# Patient Record
Sex: MALE | Race: WHITE
[De-identification: names, ages, dates, MRNs, and addresses within clinical notes are randomized per-mention and may not be internally consistent; named-entity substitution may affect disease eponyms.]

---

## 2022-06-30 ENCOUNTER — HOSPITAL ENCOUNTER (OUTPATIENT)
Dept: HOSPITAL 47 - OR | Age: 47
Discharge: HOME | End: 2022-06-30
Attending: UROLOGY
Payer: MEDICARE

## 2022-06-30 VITALS — RESPIRATION RATE: 16 BRPM

## 2022-06-30 VITALS — TEMPERATURE: 97.5 F

## 2022-06-30 VITALS — HEART RATE: 99 BPM | SYSTOLIC BLOOD PRESSURE: 125 MMHG | DIASTOLIC BLOOD PRESSURE: 86 MMHG

## 2022-06-30 VITALS — BODY MASS INDEX: 23.6 KG/M2

## 2022-06-30 DIAGNOSIS — N13.2: Primary | ICD-10-CM

## 2022-06-30 DIAGNOSIS — G40.909: ICD-10-CM

## 2022-06-30 DIAGNOSIS — Z79.899: ICD-10-CM

## 2022-06-30 DIAGNOSIS — Z90.79: ICD-10-CM

## 2022-06-30 DIAGNOSIS — Z85.47: ICD-10-CM

## 2022-06-30 DIAGNOSIS — G80.9: ICD-10-CM

## 2022-06-30 PROCEDURE — 74018 RADEX ABDOMEN 1 VIEW: CPT

## 2022-06-30 PROCEDURE — 52356 CYSTO/URETERO W/LITHOTRIPSY: CPT

## 2022-06-30 PROCEDURE — 82365 CALCULUS SPECTROSCOPY: CPT

## 2022-06-30 NOTE — P.OP
Date of Procedure: 06/30/22


Preoperative Diagnosis: 


Right renal calculus, right ureteral calculus


Postoperative Diagnosis: 


Same


Procedure(s) Performed: 


Cystoscopy, right ureteroscopy with Holmium laser lithotripsy and stone 

basketing, right ureteral stent change


Anesthesia: GETA


Surgeon: Bird Crain


Estimated Blood Loss (ml): 10


IV fluids (ml): 600


Pathology: other (Calculus fragments, sent for chemical analysis)


Condition: stable


Disposition: PACU


Indications for Procedure: 


The patient is a 46-year-old white male with a history of left testicular 

cancer.  He underwent a left orchiectomy 9 years ago and did not receive any 

adjuvant therapy.  He has had no recurrences.  He has a history of cerebral 

palsy and is noncommunicative.  He was admitted last month with acute onset of 

abdominal pain.  He had a known right renal calculus.  Computed tomography scan 

showed mild to moderate right hydronephrosis due to a 5 x 8 mm right distal 

ureteral calculus.  A 3 mm nonobstructing right renal calculus was also seen.  

He was found to have an obstructing calculus at the level of the SI joint, but 

it could not be reached ureteroscopically and therefore a ureteral stent was 

placed.


Operative Findings: 


Right ureteral calculus at the level of the iliac vessels, fragmented and 

removed completely.  A right renal calculus was seen and removed, though was 

unclear whether this may have been a fragment of the ureteral calculus which 

refluxed into the kidney.


Description of Procedure: 


The patient was taken to the operating room and placed in the dorsolithotomy 

position, with legs supported in Geovany stirrups.  The external genitalia was 

prepped and draped sterilely.  The 30 lens was used to introduce the 21-Indonesian 

Torres cystoscopic sheath through the urethra and into the bladder under direct 

vision.  The prostatic urethra showed evidence of mild lateral lobe enlargement.

 The bladder was examined in its entirety.  No abnormalities were seen.  

Grasping forceps were used to grasp the distal end of the right ureteral stent, 

which was removed along with the cystoscope.





A 0.038 inch Glidewire was passed through the stent and up to the right renal 

pelvis.  The stent was removed, and an 11/13-Indonesian ureteral access catheter was

passed over the wire, up to the level of the calculus.  The Torres Cobra flexible 

ureteroscope was then passed through the ureteral access catheter sheath, up to 

the stone.  The 200 micron Holmium laser probe was passed through the 

ureteroscope, and lithotripsy was performed.  The calculus fragmented readily.  

Once it was completely fragmented, several fragments refluxed proximally.  The 

ureteroscope was advanced up to the right kidney.  Each calyx was examined.  

Only one calculus was seen, within a mid pole calyx, and this appeared to have 

been a fragment from the ureteral calculus.  A 1.9-Indonesian nitinol basket was 

used to grasp this fragment and remove it.  Next, each of the remaining calculus

fragments within the ureter were removed via stone basketing.  The ureteral 

access catheter sheath came out, and the Torres semirigid ureteroscope was passed 

into the bladder under direct vision to inspect the distal ureter.  This 

confirmed that there were no residual calculus fragments seen within the ureter,

and there was no evidence of ureteral perforation.  The Glidewire was passed up 

to the right renal pelvis, and the ureteroscope was removed.  The Glidewire was 

backloaded into the cystoscope, which was passed into the bladder.  A 22 cm, 6-

Indonesian double-J ureteral stent was placed over the wire.  Proper stent 

positioning was verified fluoroscopically and endoscopically.  The bladder was 

emptied and the cystoscope removed.  The string was left attached to the stent, 

and was secured to the penis using a Tegaderm dressing.  The patient tolerated 

the procedure well and was taken to the recovery room in stable condition.











TAMMIE MONTANO Report:  





Procedure Acuity:  Elective


Stone Size and Location:  


Ureteral Dilation:  No


Ureteral Access Sheath Used:  Yes


Stone Sent for Analysis:  Yes


All Stones/Fragments Were Removed with a Basket:  Yes


Complications:  No


Preoperative Antibiotics Given:  Yes


Stent Placed:  Yes


If Stent Placed, Was String Left Attached:  Yes


If Stent Placed, When is it to be Removed:  1 week


Discharge Medications: None

## 2022-06-30 NOTE — XR
EXAMINATION TYPE: XR KUB

 

DATE OF EXAM: 6/30/2022

 

COMPARISON: NONE

 

INDICATION: Right ureteric stone

 

TECHNIQUE: AP view of the abdomen and pelvis.

 

FINDINGS: 

Suspected left-sided  shunt coursing to the right side of the abdomen with the tip seen in the left
 side of the abdomen. Right-sided double-J ureteric stent.

 

No definite radiodense urinary calculi. Significant gaseous distention of the colon and to lesser ext
ent the small bowel almost completely obscuring the renal shadows.

 

Renal or ureteric calculi cannot be excluded by this x-ray. Levoscoliosis of the lumbar spine.

 

IMPRESSION: 

As above.

## 2022-06-30 NOTE — P.GSHP
History of Present Illness


H&P Date: 06/30/22


Chief Complaint: Abdominal pain


The patient is a 46-year-old white male with a history of left testicular 

cancer.  He underwent a left orchiectomy 9 years ago and did not receive any 

adjuvant therapy.  He has had no recurrences.  He has a history of cerebral 

palsy and is noncommunicative.  He was admitted last month with acute onset of 

abdominal pain.  He had a known right renal calculus.  Computed tomography scan 

showed mild to moderate right hydronephrosis due to a 5 x 8 mm right distal 

ureteral calculus.  A 3 mm nonobstructing right renal calculus was also seen.  

He was found to have an obstructing calculus at the level of the SI joint, but 

it could not be reached ureteroscopically and therefore a ureteral stent was 

placed.








- Constitutional


Constitutional: Denies chills, Denies fever





- Cardiovascular


Cardiovascular: Denies chest pain, Denies palpitations





- Respiratory


Respiratory: Denies cough, Denies dyspnea





- Genitourinary (Male)


Genitourinary: Denies dysuria, Denies hematuria





Past Medical History


Past Medical History: Cancer, Seizure Disorder


Additional Past Medical History / Comment(s): Hx testicular cancer 7 yrs ago 

with surgery. Cerebral Palsy, has shunt. Non verbal, immobile, in wheelchair, 

only has use of right arm, chokes easily, food needs to be cut up very small.


History of Any Multi-Drug Resistant Organisms: None Reported


Additional Past Surgical History / Comment(s): Shunt for cerebral palsy, 

testicle removed.


Past Anesthesia/Blood Transfusion Reactions: Family History of Problems w/ 

Anesthesia


Additional Past Anesthesia/Blood Transfusion Reaction / Comment(s): Dad had 

problems with hallucinations after anesthesia.


Past Psychological History: No Psychological Hx Reported


Smoking Status: Never smoker


Past Alcohol Use History: None Reported


Past Drug Use History: None Reported





- Past Family History


  ** Father


Family Medical History: No Reported History





Medications and Allergies


                                Home Medications











 Medication  Instructions  Recorded  Confirmed  Type


 


Lacosamide [Vimpat Oral Soln] 10 mg PO BID 06/29/22 06/29/22 History








                                    Allergies











Allergy/AdvReac Type Severity Reaction Status Date / Time


 


No Known Allergies Allergy   Verified 06/29/22 10:25














Surgical - Exam





- General


well developed, well nourished, no distress





- Neck


no masses, trachea midline





- Respiratory


normal respiratory effort





- Abdomen


Abdomen: soft, non tender, no guarding, no rigid, no rebound





- Genitourinary


normal penis with no external lesions, testicles non-tender





- Psychiatric


oriented to time, oriented to person, oriented to place, speech is normal, 

memory intact





Results





- Imaging


CT scan - abdomen: report reviewed, image reviewed





Assessment and Plan


(1) Calculus of ureter


Status: Acute   Code(s): N20.1 - CALCULUS OF URETER   SNOMED Code(s): 02317169


   





(2) Calculus of kidney


Status: Acute   Code(s): N20.0 - CALCULUS OF KIDNEY   SNOMED Code(s): 36602141


   


Plan: 


Cystoscopy, right ureteral stent removal, right ureteroscopy with Holmium laser 

lithotripsy and possible stone basketing.  The procedure has been reviewed in 

detail with Aldair's parents.  They have been made aware of potential risks, 

which include anesthesia, bleeding, infection, ureteral injury, and inability to

successfully remove the calculus.

## 2022-07-01 NOTE — FL
EXAMINATION TYPE: FL guidance operating room

 

DATE OF EXAM: 6/30/2022

 

CLINICAL HISTORY: Right ureteral stone

 

TECHNIQUE: Fluoroscopic-guided procedure

 

COMPARISON:  X-ray dated 6/13/2022 at 7:19 AM

 

FINDINGS:  Fluoroscopic guidance was provided during the procedure. A total of 17 seconds of fluorosc
opic time was utilized during the procedure and 2 spot images were acquired.

 

IMPRESSION:  As Above.